# Patient Record
Sex: MALE | Race: BLACK OR AFRICAN AMERICAN
[De-identification: names, ages, dates, MRNs, and addresses within clinical notes are randomized per-mention and may not be internally consistent; named-entity substitution may affect disease eponyms.]

---

## 2017-06-12 ENCOUNTER — HOSPITAL ENCOUNTER (EMERGENCY)
Dept: HOSPITAL 47 - EC | Age: 52
Discharge: HOME | End: 2017-06-12
Payer: COMMERCIAL

## 2017-06-12 VITALS
SYSTOLIC BLOOD PRESSURE: 121 MMHG | DIASTOLIC BLOOD PRESSURE: 73 MMHG | HEART RATE: 53 BPM | RESPIRATION RATE: 18 BRPM | TEMPERATURE: 97.9 F

## 2017-06-12 DIAGNOSIS — K21.9: ICD-10-CM

## 2017-06-12 DIAGNOSIS — M54.9: Primary | ICD-10-CM

## 2017-06-12 DIAGNOSIS — R10.9: ICD-10-CM

## 2017-06-12 DIAGNOSIS — Z79.899: ICD-10-CM

## 2017-06-12 LAB
ALP SERPL-CCNC: 59 U/L (ref 38–126)
ALT SERPL-CCNC: 39 U/L (ref 21–72)
AMYLASE SERPL-CCNC: 101 U/L (ref 30–110)
ANION GAP SERPL CALC-SCNC: 11 MMOL/L
APTT BLD: 26.8 SEC (ref 22–30)
AST SERPL-CCNC: 28 U/L (ref 17–59)
BASOPHILS # BLD AUTO: 0 K/UL (ref 0–0.2)
BASOPHILS NFR BLD AUTO: 0 %
BUN SERPL-SCNC: 11 MG/DL (ref 9–20)
CALCIUM SPEC-MCNC: 9.6 MG/DL (ref 8.4–10.2)
CH: 29.1
CHCM: 32.6
CHLORIDE SERPL-SCNC: 109 MMOL/L (ref 98–107)
CO2 SERPL-SCNC: 23 MMOL/L (ref 22–30)
EOSINOPHIL # BLD AUTO: 0.3 K/UL (ref 0–0.7)
EOSINOPHIL NFR BLD AUTO: 4 %
ERYTHROCYTE [DISTWIDTH] IN BLOOD BY AUTOMATED COUNT: 5.06 M/UL (ref 4.3–5.9)
ERYTHROCYTE [DISTWIDTH] IN BLOOD: 14 % (ref 11.5–15.5)
GLUCOSE SERPL-MCNC: 104 MG/DL (ref 74–99)
HCT VFR BLD AUTO: 45.5 % (ref 39–53)
HDW: 2.39
HGB BLD-MCNC: 14.5 GM/DL (ref 13–17.5)
INR PPP: 1 (ref ?–1.1)
LUC NFR BLD AUTO: 2 %
LYMPHOCYTES # SPEC AUTO: 3.5 K/UL (ref 1–4.8)
LYMPHOCYTES NFR SPEC AUTO: 45 %
MCH RBC QN AUTO: 28.7 PG (ref 25–35)
MCHC RBC AUTO-ENTMCNC: 31.9 G/DL (ref 31–37)
MCV RBC AUTO: 89.9 FL (ref 80–100)
MONOCYTES # BLD AUTO: 0.3 K/UL (ref 0–1)
MONOCYTES NFR BLD AUTO: 5 %
NEUTROPHILS # BLD AUTO: 3.4 K/UL (ref 1.3–7.7)
NEUTROPHILS NFR BLD AUTO: 44 %
NON-AFRICAN AMERICAN GFR(MDRD): >60
PARTICLE COUNT: 1272
PH UR: 6 [PH] (ref 5–8)
POTASSIUM SERPL-SCNC: 4.3 MMOL/L (ref 3.5–5.1)
PROT SERPL-MCNC: 8 G/DL (ref 6.3–8.2)
PT BLD: 10.1 SEC (ref 9–12)
RBC UR QL: 1 /HPF (ref 0–5)
SODIUM SERPL-SCNC: 143 MMOL/L (ref 137–145)
SP GR UR: 1.01 (ref 1–1.03)
SQUAMOUS UR QL AUTO: <1 /HPF (ref 0–4)
UA BILLING (MACRO VS. MICRO): (no result)
UROBILINOGEN UR QL STRIP: <2 MG/DL (ref ?–2)
WBC # BLD AUTO: 0.14 10*3/UL
WBC # BLD AUTO: 7.6 K/UL (ref 3.8–10.6)
WBC #/AREA URNS HPF: 4 /HPF (ref 0–5)
WBC (PEROX): 6.96

## 2017-06-12 PROCEDURE — 96375 TX/PRO/DX INJ NEW DRUG ADDON: CPT

## 2017-06-12 PROCEDURE — 96374 THER/PROPH/DIAG INJ IV PUSH: CPT

## 2017-06-12 PROCEDURE — 85730 THROMBOPLASTIN TIME PARTIAL: CPT

## 2017-06-12 PROCEDURE — 83690 ASSAY OF LIPASE: CPT

## 2017-06-12 PROCEDURE — 81001 URINALYSIS AUTO W/SCOPE: CPT

## 2017-06-12 PROCEDURE — 80053 COMPREHEN METABOLIC PANEL: CPT

## 2017-06-12 PROCEDURE — 85025 COMPLETE CBC W/AUTO DIFF WBC: CPT

## 2017-06-12 PROCEDURE — 85610 PROTHROMBIN TIME: CPT

## 2017-06-12 PROCEDURE — 82150 ASSAY OF AMYLASE: CPT

## 2017-06-12 PROCEDURE — 74176 CT ABD & PELVIS W/O CONTRAST: CPT

## 2017-06-12 PROCEDURE — 99284 EMERGENCY DEPT VISIT MOD MDM: CPT

## 2017-06-12 PROCEDURE — 36415 COLL VENOUS BLD VENIPUNCTURE: CPT

## 2017-06-12 PROCEDURE — 96361 HYDRATE IV INFUSION ADD-ON: CPT

## 2017-06-12 NOTE — ED
General Adult HPI





- General


Chief complaint: Abdominal Pain


Stated complaint: Abd Pain


Time Seen by Provider: 06/12/17 07:32


Source: patient, RN notes reviewed


Mode of arrival: ambulatory


Limitations: no limitations





- History of Present Illness


Initial comments: 





Patient is a pleasant 51-year-old male presenting to the emergency department 

complaining of right flank pain.  Onset of symptoms was yesterday morning.  

Fairly sudden onset.  Discomfort is positional.  Discomfort is below the right 

CVA region.  No dysuria or hematuria.  No fever.  No nausea or vomiting.  No 

history of similar symptoms previously.





- Related Data


 Home Medications











 Medication  Instructions  Recorded  Confirmed


 


Acetaminophen Tab [Tylenol Tab] 1,000 mg PO Q6HR PRN 06/12/17 06/12/17


 


Multivitamins, Thera [Multivitamin 1 tab PO DAILY 06/12/17 06/12/17





(formulary)]   


 


Omeprazole Magnesium [Prilosec Otc] 20 mg PO DAILY 06/12/17 06/12/17








 Previous Rx's











 Medication  Instructions  Recorded


 


Hydrocodone/Acetaminophen [Norco 2 each PO Q6HR PRN #20 tab 06/12/17





5-325]  











 Allergies











Allergy/AdvReac Type Severity Reaction Status Date / Time


 


No Known Allergies Allergy   Verified 06/12/17 08:01














Review of Systems


ROS Statement: 


Those systems with pertinent positive or pertinent negative responses have been 

documented in the HPI.





ROS Other: All systems not noted in ROS Statement are negative.


Constitutional: Denies: fever


Eyes: Denies: eye pain


ENT: Denies: ear pain


Respiratory: Denies: dyspnea


Cardiovascular: Denies: chest pain


Endocrine: Denies: fatigue


Gastrointestinal: Denies: abdominal pain


Genitourinary: Denies: urgency, dysuria, frequency, hematuria, discharge


Musculoskeletal: Denies: arthralgia


Skin: Denies: rash


Neurological: Denies: headache





Past Medical History


Past Medical History: GERD/Reflux


History of Any Multi-Drug Resistant Organisms: None Reported


Past Surgical History: Hernia Repair, Orthopedic Surgery


Additional Past Surgical History / Comment(s): right knee


Past Anesthesia/Blood Transfusion Reactions: No Reported Reaction


Past Psychological History: No Psychological Hx Reported


Smoking Status: Never smoker


Past Alcohol Use History: None Reported


Past Drug Use History: None Reported





- Past Family History


  ** Mother


Family Medical History: Cancer





General Exam


Limitations: no limitations


General appearance: alert, in no apparent distress


Head exam: Present: atraumatic


Eye exam: Present: normal appearance


ENT exam: Present: normal oropharynx


Neck exam: Present: normal inspection


Respiratory exam: Present: normal lung sounds bilaterally


Cardiovascular Exam: Present: regular rate, normal rhythm


  ** Expanded


Peripheral pulses: 2+: Dorsalis Pedis (R), Dorsalis Pedis (L)


GI/Abdominal exam: Present: soft.  Absent: distended, tenderness, guarding, 

rebound, rigid


Extremities exam: Present: normal inspection.  Absent: pedal edema, calf 

tenderness


Back exam: Present: tenderness (Mild tenderness below the right CVA region)


Neurological exam: Present: alert


Psychiatric exam: Present: normal affect, normal mood


Skin exam: Present: normal color





Course


 Vital Signs











  06/12/17 06/12/17





  06:58 09:48


 


Temperature 98.2 F 97.9 F


 


Pulse Rate 79 53 L


 


Respiratory 16 18





Rate  


 


Blood Pressure 139/90 121/73


 


O2 Sat by Pulse 100 99





Oximetry  














Medical Decision Making





- Medical Decision Making





Patient reexamined and resting comfortably in bed.  Patient states some 

improvement with Toradol however is receptive to further medication.  Patient 

agrees not to drive.  Patient is updated on results and need for follow-up.





- Lab Data


Result diagrams: 


 06/12/17 08:12





 06/12/17 08:12


 Lab Results











  06/12/17 06/12/17 06/12/17 Range/Units





  08:12 08:12 08:12 


 


WBC   7.6   (3.8-10.6)  k/uL


 


RBC   5.06   (4.30-5.90)  m/uL


 


Hgb   14.5   (13.0-17.5)  gm/dL


 


Hct   45.5   (39.0-53.0)  %


 


MCV   89.9   (80.0-100.0)  fL


 


MCH   28.7   (25.0-35.0)  pg


 


MCHC   31.9   (31.0-37.0)  g/dL


 


RDW   14.0   (11.5-15.5)  %


 


Plt Count   196   (150-450)  k/uL


 


Neutrophils %   44   %


 


Lymphocytes %   45   %


 


Monocytes %   5   %


 


Eosinophils %   4   %


 


Basophils %   0   %


 


Neutrophils #   3.4   (1.3-7.7)  k/uL


 


Lymphocytes #   3.5   (1.0-4.8)  k/uL


 


Monocytes #   0.3   (0-1.0)  k/uL


 


Eosinophils #   0.3   (0-0.7)  k/uL


 


Basophils #   0.0   (0-0.2)  k/uL


 


PT    10.1  (9.0-12.0)  sec


 


INR    1.0  (<1.1)  


 


APTT    26.8  (22.0-30.0)  sec


 


Sodium  143    (137-145)  mmol/L


 


Potassium  4.3    (3.5-5.1)  mmol/L


 


Chloride  109 H    ()  mmol/L


 


Carbon Dioxide  23    (22-30)  mmol/L


 


Anion Gap  11    mmol/L


 


BUN  11    (9-20)  mg/dL


 


Creatinine  1.04    (0.66-1.25)  mg/dL


 


Est GFR (MDRD) Af Amer  >60    (>60 ml/min/1.73 sqM)  


 


Est GFR (MDRD) Non-Af  >60    (>60 ml/min/1.73 sqM)  


 


Glucose  104 H    (74-99)  mg/dL


 


Calcium  9.6    (8.4-10.2)  mg/dL


 


Total Bilirubin  0.6    (0.2-1.3)  mg/dL


 


AST  28    (17-59)  U/L


 


ALT  39    (21-72)  U/L


 


Alkaline Phosphatase  59    ()  U/L


 


Total Protein  8.0    (6.3-8.2)  g/dL


 


Albumin  4.7    (3.5-5.0)  g/dL


 


Amylase  101    ()  U/L


 


Lipase  144    ()  U/L


 


Urine Color     


 


Urine Appearance     (Clear)  


 


Urine pH     (5.0-8.0)  


 


Ur Specific Gravity     (1.001-1.035)  


 


Urine Protein     (Negative)  


 


Urine Glucose (UA)     (Negative)  


 


Urine Ketones     (Negative)  


 


Urine Blood     (Negative)  


 


Urine Nitrite     (Negative)  


 


Urine Bilirubin     (Negative)  


 


Urine Urobilinogen     (<2.0)  mg/dL


 


Ur Leukocyte Esterase     (Negative)  


 


Urine RBC     (0-5)  /hpf


 


Urine WBC     (0-5)  /hpf


 


Ur Squamous Epith Cells     (0-4)  /hpf


 


Urine Bacteria     (None)  /hpf


 


Urine Mucus     (None)  /hpf














  06/12/17 Range/Units





  09:04 


 


WBC   (3.8-10.6)  k/uL


 


RBC   (4.30-5.90)  m/uL


 


Hgb   (13.0-17.5)  gm/dL


 


Hct   (39.0-53.0)  %


 


MCV   (80.0-100.0)  fL


 


MCH   (25.0-35.0)  pg


 


MCHC   (31.0-37.0)  g/dL


 


RDW   (11.5-15.5)  %


 


Plt Count   (150-450)  k/uL


 


Neutrophils %   %


 


Lymphocytes %   %


 


Monocytes %   %


 


Eosinophils %   %


 


Basophils %   %


 


Neutrophils #   (1.3-7.7)  k/uL


 


Lymphocytes #   (1.0-4.8)  k/uL


 


Monocytes #   (0-1.0)  k/uL


 


Eosinophils #   (0-0.7)  k/uL


 


Basophils #   (0-0.2)  k/uL


 


PT   (9.0-12.0)  sec


 


INR   (<1.1)  


 


APTT   (22.0-30.0)  sec


 


Sodium   (137-145)  mmol/L


 


Potassium   (3.5-5.1)  mmol/L


 


Chloride   ()  mmol/L


 


Carbon Dioxide   (22-30)  mmol/L


 


Anion Gap   mmol/L


 


BUN   (9-20)  mg/dL


 


Creatinine   (0.66-1.25)  mg/dL


 


Est GFR (MDRD) Af Amer   (>60 ml/min/1.73 sqM)  


 


Est GFR (MDRD) Non-Af   (>60 ml/min/1.73 sqM)  


 


Glucose   (74-99)  mg/dL


 


Calcium   (8.4-10.2)  mg/dL


 


Total Bilirubin   (0.2-1.3)  mg/dL


 


AST   (17-59)  U/L


 


ALT   (21-72)  U/L


 


Alkaline Phosphatase   ()  U/L


 


Total Protein   (6.3-8.2)  g/dL


 


Albumin   (3.5-5.0)  g/dL


 


Amylase   ()  U/L


 


Lipase   ()  U/L


 


Urine Color  Yellow  


 


Urine Appearance  Clear  (Clear)  


 


Urine pH  6.0  (5.0-8.0)  


 


Ur Specific Gravity  1.012  (1.001-1.035)  


 


Urine Protein  Negative  (Negative)  


 


Urine Glucose (UA)  Negative  (Negative)  


 


Urine Ketones  Negative  (Negative)  


 


Urine Blood  Negative  (Negative)  


 


Urine Nitrite  Negative  (Negative)  


 


Urine Bilirubin  Negative  (Negative)  


 


Urine Urobilinogen  <2.0  (<2.0)  mg/dL


 


Ur Leukocyte Esterase  Trace H  (Negative)  


 


Urine RBC  1  (0-5)  /hpf


 


Urine WBC  4  (0-5)  /hpf


 


Ur Squamous Epith Cells  <1  (0-4)  /hpf


 


Urine Bacteria  Rare H  (None)  /hpf


 


Urine Mucus  Rare H  (None)  /hpf














- Radiology Data


Radiology results: image reviewed (Computed tomography scan of the abdomen and 

pelvis show simple left renal cyst.)





Disposition


Clinical Impression: 


 Back pain





Disposition: HOME SELF-CARE


Condition: Stable


Instructions:  Back Pain (ED)


Additional Instructions: 


Please follow-up with your primary care physician beginning of the week.  

Return for increased pain, fever, worsening symptoms or other concerns.


Prescriptions: 


Hydrocodone/Acetaminophen [Norco 5-325] 2 each PO Q6HR PRN #20 tab


 PRN Reason: Pain


Referrals: 


Moisés Rodriguez MD [Primary Care Provider] - 1-2 days


Time of Disposition: 10:02

## 2017-06-12 NOTE — CT
EXAMINATION TYPE: CT abdomen pelvis wo con

 

DATE OF EXAM: 6/12/2017

 

COMPARISON: 2/23/2010

 

INDICATION: Rt flank pain

 

DLP: 813 mGycm, Automated exposure control for dose reduction was used.

 

CONTRAST:  0 mL of Omnipaque 300. 

                        Study performed without Oral Contrast

 

TECHNIQUE: Axial images were obtained from above the diaphragm to the pubic rami in the axial plane a
t 5 mm thick sections.  Reconstructed images are reviewed on the computer in the coronal plane.  

 

FINDINGS:

 

Limited CT sections are obtained the lung bases.  The lung bases are clear.

 

CT ABDOMEN:

 

Liver: Normal

 

Spleen: Normal

 

Pancreas: Normal

 

Adrenal glands: The adrenal glands are normal.

 

Gallbladder: Normal  

 

Kidneys: No masses are evident. No hydronephrosis is present.   There is a 1.6 cm cyst on the posteri
or mid left kidney measuring 4 Hounsfield units. A very low dense area could represent a small lipoma
 or angiomyolipoma estimated 0.7 cm and measuring -72 Hounsfield units. Series 3 image 28.  Delayed i
mages were obtained through the kidneys. The suspected lipoma is not identified on the delayed images
. This cyst is better visualized. No hydronephrosis is evident.

 

Aorta: Vascular calcification is within the aorta. 

 

Inferior vena cava: Normal.

 

CT PELVIS: 

Loops of bowel within the abdomen and pelvis are normal.     There are loops of bowel which are incom
pletely distended or lack oral contrast limiting their evaluation.

 

Appendix: Normal as visualized.

 

Urinary bladder: Normal. 

 

Genitourinary structures: Prostate is prominent.

 

Osseous structures: No suspicious lytic or sclerotic lesions. Scoliosis within the lumbar spine. Some
 sacroiliac joint degenerative changes are noted. Some narrowing of the hip joint spaces is also note
d.

 

IMPRESSIONS:

1.  Simple left renal cyst.

## 2018-03-13 ENCOUNTER — HOSPITAL ENCOUNTER (OUTPATIENT)
Dept: HOSPITAL 47 - ORWHC2ENDO | Age: 53
Discharge: HOME | End: 2018-03-13
Attending: SURGERY
Payer: COMMERCIAL

## 2018-03-13 VITALS — RESPIRATION RATE: 18 BRPM

## 2018-03-13 VITALS — HEART RATE: 61 BPM | DIASTOLIC BLOOD PRESSURE: 88 MMHG | SYSTOLIC BLOOD PRESSURE: 125 MMHG

## 2018-03-13 VITALS — BODY MASS INDEX: 26.4 KG/M2

## 2018-03-13 VITALS — TEMPERATURE: 98.1 F

## 2018-03-13 DIAGNOSIS — K21.0: Primary | ICD-10-CM

## 2018-03-13 DIAGNOSIS — K44.9: ICD-10-CM

## 2018-03-13 DIAGNOSIS — K29.60: ICD-10-CM

## 2018-03-13 DIAGNOSIS — Z79.899: ICD-10-CM

## 2018-03-13 PROCEDURE — 43239 EGD BIOPSY SINGLE/MULTIPLE: CPT

## 2018-03-13 PROCEDURE — 45380 COLONOSCOPY AND BIOPSY: CPT

## 2018-03-13 PROCEDURE — 88305 TISSUE EXAM BY PATHOLOGIST: CPT

## 2018-03-13 NOTE — P.OP
Date of Procedure: 03/13/18


Preoperative Diagnosis: 


GERD


Postoperative Diagnosis: 


Antral gastritis





Moderate size sliding hiatal hernia





Esophagitis


Anesthesia: MAC


Surgeon: Sai Tejeda


Pathology: other (Antrum, esophagus)


Condition: stable


Disposition: PACU


Description of Procedure: 


The patient's placed on the endoscopy table in the lateral position.  He 

received IV sedation.  The gastroscope placed oropharynx and passed into the 

esophagus and into the stomach.  Scope was then placed through the pylorus.  

The first and second portion of the duodenum appeared normal.  Scope was then 

brought back the antrum this was mildly inflamed.  A biopsies performed.  Scope 

was then retroflexed.  The remainder stomach appeared normal.  There was a 

moderate size sliding hiatal hernia.  The GE junction was at 38 cm.  The distal 

esophagus appeared inflamed and a biopsies performed.  The proximal esophagus 

appeared normal.  Scope was withdrawn for patient.

## 2018-03-13 NOTE — P.GSHP
History of Present Illness


H&P Date: 03/13/18


Chief Complaint: GERD





This a 52-year-old male referred from Dr. Gonzalez.  Patient presents today for 

EGD.  He's had issues with GERD.





Past Medical History


Past Medical History: GERD/Reflux


History of Any Multi-Drug Resistant Organisms: None Reported


Past Surgical History: Hernia Repair, Orthopedic Surgery


Additional Past Surgical History / Comment(s): right knee sx


Past Anesthesia/Blood Transfusion Reactions: No Reported Reaction


Smoking Status: Never smoker





- Past Family History


  ** Mother


Family Medical History: Cancer





Medications and Allergies


 Home Medications











 Medication  Instructions  Recorded  Confirmed  Type


 


Omeprazole Magnesium [Prilosec Otc] 20 mg PO DAILY 06/12/17 03/13/18 History











 Allergies











Allergy/AdvReac Type Severity Reaction Status Date / Time


 


No Known Allergies Allergy   Verified 03/13/18 07:09














Surgical - Exam


 Vital Signs











Temp Pulse Resp BP Pulse Ox


 


 98.1 F   61   16   140/95   98 


 


 03/13/18 07:10  03/13/18 07:10  03/13/18 07:10  03/13/18 07:10  03/13/18 07:10














- General


well developed, no distress





- Eyes


PERRL





- ENT


normal pinna





- Neck


no masses





- Respiratory


normal expansion





- Cardiovascular


Rhythm: regular





- Abdomen


Abdomen: soft, non tender


Hernia: umbilical (Previous umbilical hernia with large firm mass questionable 

recurrent hernia)





Assessment and Plan


Assessment: 





GERD.  We'll perform EGD.

## 2018-03-22 ENCOUNTER — HOSPITAL ENCOUNTER (OUTPATIENT)
Dept: HOSPITAL 47 - LABWHC1 | Age: 53
Discharge: HOME | End: 2018-03-22
Attending: SURGERY
Payer: COMMERCIAL

## 2018-03-22 DIAGNOSIS — I51.7: ICD-10-CM

## 2018-03-22 DIAGNOSIS — F17.200: ICD-10-CM

## 2018-03-22 DIAGNOSIS — R94.31: ICD-10-CM

## 2018-03-22 DIAGNOSIS — Z01.818: Primary | ICD-10-CM

## 2018-03-22 DIAGNOSIS — K21.9: ICD-10-CM

## 2018-03-22 DIAGNOSIS — D64.9: ICD-10-CM

## 2018-03-22 DIAGNOSIS — Z01.812: ICD-10-CM

## 2018-03-22 LAB
BASOPHILS # BLD AUTO: 0 K/UL (ref 0–0.2)
BASOPHILS NFR BLD AUTO: 0 %
EOSINOPHIL # BLD AUTO: 0.2 K/UL (ref 0–0.7)
EOSINOPHIL NFR BLD AUTO: 4 %
ERYTHROCYTE [DISTWIDTH] IN BLOOD BY AUTOMATED COUNT: 4.92 M/UL (ref 4.3–5.9)
ERYTHROCYTE [DISTWIDTH] IN BLOOD: 13.6 % (ref 11.5–15.5)
HCT VFR BLD AUTO: 43.4 % (ref 39–53)
HGB BLD-MCNC: 13.6 GM/DL (ref 13–17.5)
LYMPHOCYTES # SPEC AUTO: 2.4 K/UL (ref 1–4.8)
LYMPHOCYTES NFR SPEC AUTO: 38 %
MCH RBC QN AUTO: 27.6 PG (ref 25–35)
MCHC RBC AUTO-ENTMCNC: 31.4 G/DL (ref 31–37)
MCV RBC AUTO: 88.1 FL (ref 80–100)
MONOCYTES # BLD AUTO: 0.4 K/UL (ref 0–1)
MONOCYTES NFR BLD AUTO: 7 %
NEUTROPHILS # BLD AUTO: 3.1 K/UL (ref 1.3–7.7)
NEUTROPHILS NFR BLD AUTO: 49 %
PLATELET # BLD AUTO: 201 K/UL (ref 150–450)
WBC # BLD AUTO: 6.3 K/UL (ref 3.8–10.6)

## 2018-03-22 PROCEDURE — 85025 COMPLETE CBC W/AUTO DIFF WBC: CPT

## 2018-03-22 PROCEDURE — 93005 ELECTROCARDIOGRAM TRACING: CPT

## 2018-03-22 PROCEDURE — 36415 COLL VENOUS BLD VENIPUNCTURE: CPT

## 2018-03-29 ENCOUNTER — HOSPITAL ENCOUNTER (OUTPATIENT)
Dept: HOSPITAL 47 - OR | Age: 53
Setting detail: OBSERVATION
LOS: 1 days | Discharge: HOME | End: 2018-03-30
Attending: SURGERY | Admitting: SURGERY
Payer: COMMERCIAL

## 2018-03-29 VITALS — BODY MASS INDEX: 26.4 KG/M2

## 2018-03-29 DIAGNOSIS — I10: ICD-10-CM

## 2018-03-29 DIAGNOSIS — K44.9: ICD-10-CM

## 2018-03-29 DIAGNOSIS — Z80.9: ICD-10-CM

## 2018-03-29 DIAGNOSIS — Z79.899: ICD-10-CM

## 2018-03-29 DIAGNOSIS — R06.83: ICD-10-CM

## 2018-03-29 DIAGNOSIS — K21.0: Primary | ICD-10-CM

## 2018-03-29 PROCEDURE — 86900 BLOOD TYPING SEROLOGIC ABO: CPT

## 2018-03-29 PROCEDURE — 74210 X-RAY XM PHRNX&/CRV ESOPH C+: CPT

## 2018-03-29 PROCEDURE — 86901 BLOOD TYPING SEROLOGIC RH(D): CPT

## 2018-03-29 PROCEDURE — 43281 LAP PARAESOPHAG HERN REPAIR: CPT

## 2018-03-29 PROCEDURE — 86850 RBC ANTIBODY SCREEN: CPT

## 2018-03-29 RX ADMIN — ONDANSETRON ONE MG: 2 INJECTION INTRAMUSCULAR; INTRAVENOUS at 07:16

## 2018-03-29 RX ADMIN — MORPHINE SULFATE PRN MG: 1 INJECTION, SOLUTION EPIDURAL; INTRATHECAL; INTRAVENOUS at 14:33

## 2018-03-29 RX ADMIN — DEXTROSE MONOHYDRATE, SODIUM CHLORIDE, AND POTASSIUM CHLORIDE SCH MLS/HR: 50; 4.5; 1.49 INJECTION, SOLUTION INTRAVENOUS at 13:06

## 2018-03-29 RX ADMIN — ONDANSETRON ONE MG: 2 INJECTION INTRAMUSCULAR; INTRAVENOUS at 09:37

## 2018-03-29 RX ADMIN — POTASSIUM CHLORIDE SCH MLS: 14.9 INJECTION, SOLUTION INTRAVENOUS at 07:11

## 2018-03-29 RX ADMIN — MORPHINE SULFATE PRN MG: 1 INJECTION, SOLUTION EPIDURAL; INTRATHECAL; INTRAVENOUS at 10:36

## 2018-03-29 RX ADMIN — LISINOPRIL AND HYDROCHLOROTHIAZIDE SCH EACH: 12.5; 1 TABLET ORAL at 13:07

## 2018-03-29 RX ADMIN — DEXTROSE MONOHYDRATE, SODIUM CHLORIDE, AND POTASSIUM CHLORIDE SCH: 50; 4.5; 1.49 INJECTION, SOLUTION INTRAVENOUS at 20:26

## 2018-03-29 RX ADMIN — FAMOTIDINE SCH MG: 10 INJECTION, SOLUTION INTRAVENOUS at 20:27

## 2018-03-29 RX ADMIN — MORPHINE SULFATE PRN MG: 1 INJECTION, SOLUTION EPIDURAL; INTRATHECAL; INTRAVENOUS at 18:46

## 2018-03-29 NOTE — CONS
CONSULTATION



DATE OF CONSULTATION:

03/29/2018



REASON FOR CONSULTATION:

Medical management requested by Dr. Tejeda.



CONSULTATION:

This is a pleasant 52-year-old patient of Dr. Tyson who has undergone Nissen

fundoplication surgery.  Patient for quite a while has been having reflux symptoms,

gets better with medication, gets worse if he stops taking it, does not interfere with

his work.  More so when he lays down, he stays. Finally decided to proceed with

surgery.  He was having some epigastric discomfort at times, too.  Something would make

it worse.  Post procedure, patient is having some pain at the operative site.  Tired.

Wife at the bedside.  Blood pressure is running a bit high, never had high blood

pressure before. Patient is also having a workup of obstructive sleep apnea, due to go

for study.  Patient snores a lot at night and wakes up suddenly, according to the wife.



REVIEW OF SYSTEMS:

CONSTITUTIONAL: Tired.

HEENT:  None.

RESPIRATORY:  None.

CARDIOVASCULAR:  None.

GASTROINTESTINAL:  As above.

GENITOURINARY:  None.

MUSCULOSKELETAL:  None.

DERMATOLOGICAL:  None.

HEMATOLOGIC:  None.

LYMPHATIC: None.

PSYCHIATRY:  None.

NEUROLOGICAL:  Snoring.



PAST MEDICAL HISTORY:

GERD, hiatal hernia, snoring workup in place with obstructive sleep apnea.



PAST SURGICAL HISTORY:

Right knee surgery, EGD, colonoscopy.



SOCIAL HISTORY:

No smoking.  Does marijuana twice a day.  Works as a  in a factory.  .



FAMILY HISTORY:

Cancer, type unknown.



HOME MEDICATIONS:

Prilosec 20 mg a day.



ALLERGIES:

None.



PHYSICAL EXAMINATION:

Temperature 98.2 pulse 65, respiratory rate is 16, blood pressure 152/87, pulse ox 94%

on room air.

GENERAL APPEARANCE:  Average build, lying in bed, tired appearing.

EYES:  Pupils equal, conjunctivae normal.

HEENT:  External appearance of nose and ears normal.

NECK:  JVD not raised.  Mass not palpable.  Respiratory effort normal.  Lungs are

clear.

CARDIOVASCULAR:  First and second sounds normal. No edema.

ABDOMEN:  Upper abdomen tenderness.  No guarding or rigidity.  Liver and spleen not

palpable.  Bowel sounds are present.

LYMPHATIC:  No lymph nodes palpable in neck or axillae.

PSYCHIATRY:  Alert and oriented x3.  Mood and affect normal.

NEUROLOGICAL:  Pupils equal. Cranial nerves grossly intact. Power and sensation grossly

intact.



INVESTIGATIONS:

Blood work from 3/22/2018 shows a white count of 6.3, hemoglobin 13.6.



ASSESSMENT:

1. Status post Nissen fundoplication for gastroesophageal reflux disease.

2. Chronic gastroesophageal reflux disease.

3. Chronic hiatal hernia.

4. Essential hypertension, new diagnosis.



PLAN:

Patient is to continue on PPIs.  We will start the patient on lisinopril and

hydrochlorothiazide.  Care was discussed with the patient and wife.  Patient is to

follow up with the doctor to keep his appointment for sleep apnea.  Pain management per

Dr. Tejeda.



Thank you Dr. Tejeda.





MMODL / KATHYN: [id number] / Job#: [id number]

## 2018-03-29 NOTE — FL
SINGLE CONTRAST esophagram:

 

HISTORY: 52-year-old male status post Nissen fundoplication, rule out leak/obstruction

 

TECHNIQUE:  Single contrast exam performed with 50 ml Omnipaque 350 contrast.

 

Total fluoroscopy time: 26 seconds.

Total images: 24.

 

 

FINDINGS:  

The patient swallowed oral contrast without difficulty or delay.  Esophageal peristalsis and motility
 are within normal limits. Postsurgical changes of Nissen fundoplication are demonstrated. There is g
ood flow of contrast along the course of the surgical site and accumulation in the stomach. There is 
no evidence of contrast extravasation to suggest leak. Trace post surgical free air seen below the ri
ght hemidiaphragm.

 

 

IMPRESSION: 

 

1. No evidence of leak or significant obstruction status post Nissen fundoplication. 

2. Trace postsurgical free air seen on the right.

## 2018-03-29 NOTE — P.GSHP
History of Present Illness


H&P Date: 03/29/18


Chief Complaint: GERD





This is a 52-year-old male referred from Dr. Pritchard.The patient has had long-

standing problems with reflux esophagitis.  The patient underwent recent EGD is 

found have evidence of esophagitis.  Patient has been well informed on the 

procedure of laparoscopic Nissen fundoplication.  The patient is aware the risk 

of the conversion to the open procedure, risk of injury to the stomach, liver 

and spleen.  The patient is also a risk of recurrent GERD and dysphagia 

symptoms.  The patient understands there is a postoperative diet of full 

liquids for 2 weeks after surgery.





Past Medical History


Past Medical History: GERD/Reflux


Additional Past Medical History / Comment(s): hiatal hernia,


History of Any Multi-Drug Resistant Organisms: None Reported


Past Surgical History: Hernia Repair, Orthopedic Surgery


Additional Past Surgical History / Comment(s): right knee surgery, EGD, 

colonoscopy


Past Anesthesia/Blood Transfusion Reactions: No Reported Reaction


Smoking Status: Never smoker





- Past Family History


  ** Mother


Family Medical History: Cancer





Medications and Allergies


 Home Medications











 Medication  Instructions  Recorded  Confirmed  Type


 


Omeprazole Magnesium [Prilosec Otc] 20 mg PO DAILY 06/12/17 03/29/18 History











 Allergies











Allergy/AdvReac Type Severity Reaction Status Date / Time


 


No Known Allergies Allergy   Verified 03/29/18 07:09














Surgical - Exam


 Vital Signs











Temp Pulse Resp BP Pulse Ox


 


 97.8 F   59 L  16   128/86   97 


 


 03/29/18 07:09  03/29/18 07:09  03/29/18 07:09  03/29/18 07:09  03/29/18 07:09














- General


well developed, no distress





- Eyes


PERRL





- ENT


normal pinna





- Neck


no masses





- Respiratory


normal expansion





- Cardiovascular


Rhythm: regular





- Abdomen


Abdomen: soft, non tender





Assessment and Plan


Assessment: 





GERD.  We'll perform laparoscopic Nissen fundoplication.

## 2018-03-29 NOTE — P.OP
Date of Procedure: 03/29/18


Preoperative Diagnosis: 


GERD


Postoperative Diagnosis: 


GERD


Procedure(s) Performed: 


Laparoscopic Nissen fundoplication


Anesthesia: BASHIR


Surgeon: Sai Tejeda


Pathology: none sent


Condition: stable


Disposition: PACU


Description of Procedure: 


The patient was placed on the operating table in the supine position.  The 

patient received general anesthesia.  And was placed in dorsal lithotomy 

position.  The patient was prepped and draped in the usual sterile fashion.  

The skin incision sites were anesthetized with 1% local Xylocaine.  The skin 

was incised in the left periumbilical area and then using a blade less 5 mm 

trocar under direct visualization panel cavity was entered.  After adequate 

insufflation the laparoscope was then placed into the peritoneal cavity.  Next 

a 5 mm trochars placed in the right epigastric position.  Another 5 millimeter 

trocar the right lateral position.  Another 5 millimeter trocar in the left 

lateral position a 5 mm trocar is placed in the left epigastric position.  And 

then the initial 5 mm trocar was exchanged for a 10 mm trocar.  The left 

lateral lobe liver was retracted.  The hernia was seen.  The crural defect was 

then dissected using the Harmonic scissors device.  A 360 crural dissection 

was performed the esophagus stomach was reduced back into the peritoneal 

Cavity.  The crural defect was then closed using 2-0 Ethibond suture.  Next the 

fundus of the stomach was mobilized using the [place] scissors device. and then 

a 58-Amharic bougie dilator was placed oropharynx passed into the esophagus and 

stomach the fundal plication wrap was then performed by grasping the fundus 

posteriorly and bringing it around the esophagus and stomach fundoplication was 

then performed using 2-0 Ethibond suture.  Care was taken that the fundal 

location rested over top of the intra-abdominal esophagus.  There was no injury 

seen to the stomach or esophagus.  The dilator was then withdrawn.  The abdomen 

was irrigated there is no bleeding seen.  The trochars were then withdrawn and 

then skin incision sites were closed using 3-0 Monocryl suture Steri-Strips are 

applied.  Patient thought procedure well and sent to recovery room in stable 

condition.

## 2018-03-30 VITALS
DIASTOLIC BLOOD PRESSURE: 99 MMHG | RESPIRATION RATE: 17 BRPM | TEMPERATURE: 98.8 F | HEART RATE: 70 BPM | SYSTOLIC BLOOD PRESSURE: 154 MMHG

## 2018-03-30 RX ADMIN — LISINOPRIL AND HYDROCHLOROTHIAZIDE SCH EACH: 12.5; 1 TABLET ORAL at 08:51

## 2018-03-30 RX ADMIN — DEXTROSE MONOHYDRATE, SODIUM CHLORIDE, AND POTASSIUM CHLORIDE SCH: 50; 4.5; 1.49 INJECTION, SOLUTION INTRAVENOUS at 04:31

## 2018-03-30 RX ADMIN — POTASSIUM CHLORIDE SCH: 14.9 INJECTION, SOLUTION INTRAVENOUS at 06:33

## 2018-03-30 RX ADMIN — FAMOTIDINE SCH MG: 10 INJECTION, SOLUTION INTRAVENOUS at 08:51

## 2018-03-30 RX ADMIN — DEXTROSE MONOHYDRATE, SODIUM CHLORIDE, AND POTASSIUM CHLORIDE SCH: 50; 4.5; 1.49 INJECTION, SOLUTION INTRAVENOUS at 08:50

## 2018-03-30 RX ADMIN — MORPHINE SULFATE PRN MG: 1 INJECTION, SOLUTION EPIDURAL; INTRATHECAL; INTRAVENOUS at 01:33

## 2018-03-30 NOTE — PN
PROGRESS NOTE



DATE OF SERVICE:

03/30/2018



PRESENTING COMPLAINT:

Abdominal surgery.



INTERVAL HISTORY:

Patient is status post Nissen fundoplication, is doing much better today, up and about

in the hallway.  No nausea, vomiting, did tolerate a clear liquid diet.  Slight pain is

present.



REVIEW OF SYSTEMS:

Done for constitutional, cardiovascular, GI, pulmonary; relevant findings as above.



CURRENT MEDICATIONS:

Reviewed that include Zestoretic.



EXAMINATION:

Temperature 98.8, pulse 70, respirations 17, blood pressure 124/99, pulse ox 96% on

room air.

GENERAL APPEARANCE:  Sitting up, comfortable.

EYES:  Pupils normal.  Conjunctivae normal.

HEENT:  External nose and ears normal.  Oral cavity normal.

NECK:  JVD not raised.  Mass not palpable.

RESPIRATORY:  Effort normal.  Lungs are clear.

CARDIOVASCULAR:  First and second sounds normal.  No edema.

ABDOMEN:  Minimal tenderness, soft.  Liver, spleen not palpable.

PSYCHIATRY:  Alert and oriented x3.  Mood and affect were normal.



INVESTIGATIONS:

No blood work.



ASSESSMENT:

1. Status post Nissen fundoplication.

2. Chronic gastroesophageal reflux disease.

3. Chronic hiatal hernia, repair as above.

4. Essential hypertension.



PLAN:

Patient is told to take his medications and continue his PPI.  Should follow up with

his family doctor upon discharge.



Thank you, Dr. Tejeda.





MELECIOL / KATHYN: [id number] / Job#: [id number]

## 2018-03-30 NOTE — P.DS
Providers


Date of admission: 


03/29/18 18:59





Expected date of discharge: 03/30/18


Attending physician: 


Sai Tejeda





Consults: 





 





03/29/18 09:11


Consult Physician Routine 


   Consulting Provider: Reddy Zhao


   Consult Reason/Comments: Medical management


   Do you want consulting provider notified?: Yes











Primary care physician: 


Moisés Rodriguez





[facility] Course: 





52-year-old male presented on an elective admission to undergo a procedure 

laparoscopic Nissen fundoplication for symptomatic esophageal reflux.  Done on 

March 29.  Patient was tolerating a full liquid diet.  Surgical dressing sites 

dry.  Esophagram with no evidence of a leak or significant obstruction  Patient'

s blood pressure was noted to be slightly elevated  this can be worked up in 

the outpatient setting by his family doctor  this was discussed with the 

patient.  Additionally patient reportedly is being worked up for obstructive 

sleep apnea sleep study can be arranged with his primary doctor as well.  

Patient was felt to be stable and appropriate proceed with a discharge to home





Impression discharge diagnosis


Essential hypertension new diagnosis


March 29 laparoscopic Nissen fundoplication for symptomatic esophageal reflux 

symptoms


Chronic hiatal hernia


Chronic gastroesophageal reflux


Recent EGD showing evidence of esophagitis











The above impression and plan of care have been discussed and directed by 

signing physician. Lary Mota nurse practitioner acting as scribe for signing 

physician.





Plan - Discharge Summary


Discharge Rx Participant: Yes


New Discharge Prescriptions: 


New


   Lisinopril-Hctz 10-12.5 mg [Zestoretic 10-12.5] 1 each PO DAILY #30 tab


   HYDROcodone/APAP 5-325MG [Norco 5-325] 1 tab PO Q4HR PRN #15 tab


     PRN Reason: Mild To Moderate Pain





No Action


   Omeprazole Magnesium [Prilosec Otc] 20 mg PO DAILY


Discharge Medication List





Omeprazole Magnesium [Prilosec Otc] 20 mg PO DAILY 06/12/17 [History]


HYDROcodone/APAP 5-325MG [Norco 5-325] 1 tab PO Q4HR PRN #15 tab 03/30/18 [Rx]


Lisinopril-Hctz 10-12.5 mg [Zestoretic 10-12.5] 1 each PO DAILY #30 tab 03/30/ 18 [Rx]








Follow up Appointment(s)/Referral(s): 


Sai Tejeda MD [STAFF PHYSICIAN] - 1 Week


Moisés Rodriguez MD [Primary Care Provider] - 04/02/18


Activity/Diet/Wound Care/Special Instructions: 


No tub bath for six weeks.


Shower daily. 


No lifting over 10 pounds for the next 6 weeks.


Postsurgical procedure liquid diet for 2 weeks


Follow-up with Dr rodriguez.  Within the week for further recommendations for 

hypertension


May use ice packs to surgical site.


No driving while taking narcotic for pain.





Discharge Disposition: HOME SELF-CARE

## 2018-07-18 ENCOUNTER — HOSPITAL ENCOUNTER (EMERGENCY)
Dept: HOSPITAL 47 - EC | Age: 53
Discharge: HOME | End: 2018-07-18
Payer: COMMERCIAL

## 2018-07-18 VITALS
DIASTOLIC BLOOD PRESSURE: 85 MMHG | SYSTOLIC BLOOD PRESSURE: 134 MMHG | HEART RATE: 54 BPM | RESPIRATION RATE: 18 BRPM | TEMPERATURE: 98 F

## 2018-07-18 DIAGNOSIS — S93.401A: Primary | ICD-10-CM

## 2018-07-18 DIAGNOSIS — Z98.890: ICD-10-CM

## 2018-07-18 PROCEDURE — 99284 EMERGENCY DEPT VISIT MOD MDM: CPT

## 2018-07-18 NOTE — ED
Lower Extremity Injury HPI





- General


Chief Complaint: Extremity Injury, Lower


Stated Complaint: Poss DVT sent by ME


Time Seen by Provider: 07/18/18 10:43


Source: patient, RN notes reviewed, old records reviewed


Mode of arrival: wheelchair


Limitations: physical limitation





- History of Present Illness


Initial Comments: 





52-year-old male persist presentation with right leg swelling.  He reports that 

on July 4 he sprained his ankle.  He states since that time he's been working 

and standing on it.  He states that he has been having pain and worsening 

swelling.  He denies any peripheral paresthesias.  Pain is mainly on ankle and 

he reports severe swelling of the foot.  No history of blood clots.  Was sent 

in by [employer] wrist or ultrasound for concern for DVT.





- Related Data


 Previous Rx's











 Medication  Instructions  Recorded


 


Ibuprofen [Motrin] 600 mg PO Q6HR PRN #20 tab 07/18/18











 Allergies











Allergy/AdvReac Type Severity Reaction Status Date / Time


 


No Known Allergies Allergy   Verified 07/18/18 11:06














Review of Systems


ROS Statement: 


Those systems with pertinent positive or pertinent negative responses have been 

documented in the HPI.





ROS Other: All systems not noted in ROS Statement are negative.





Past Medical History


Past Medical History: GERD/Reflux


Additional Past Medical History / Comment(s): hiatal hernia,


History of Any Multi-Drug Resistant Organisms: None Reported


Past Surgical History: Hernia Repair, Orthopedic Surgery


Additional Past Surgical History / Comment(s): right knee surgery, EGD, 

colonoscopy


Past Anesthesia/Blood Transfusion Reactions: No Reported Reaction


Past Psychological History: No Psychological Hx Reported


Smoking Status: Never smoker


Past Alcohol Use History: None Reported


Past Drug Use History: Marijuana





- Past Family History


  ** Mother


Family Medical History: Cancer





General Exam





- General Exam Comments


Initial Comments: 





52-year-old male.  Alert and oriented.  No significant distress.





Limitations: physical limitation


General appearance: alert, in no apparent distress


Head exam: Present: atraumatic, normocephalic, normal inspection


Eye exam: Present: normal appearance, PERRL, EOMI.  Absent: scleral icterus, 

conjunctival injection, periorbital swelling


ENT exam: Present: normal exam, mucous membranes moist


Neck exam: Present: normal inspection.  Absent: tenderness, meningismus, 

lymphadenopathy


Respiratory exam: Present: normal lung sounds bilaterally.  Absent: respiratory 

distress, wheezes, rales, rhonchi, stridor


Cardiovascular Exam: Present: regular rate, normal rhythm, normal heart sounds.

  Absent: systolic murmur, diastolic murmur, rubs, gallop, clicks


GI/Abdominal exam: Present: soft, normal bowel sounds.  Absent: distended, 

tenderness, guarding, rebound, rigid


Extremities exam: Present: full ROM, normal capillary refill.  Absent: normal 

inspection, tenderness, pedal edema, joint swelling, calf tenderness


  ** Right


Lower Leg exam: Present: normal inspection, full ROM


Ankle exam: Present: tenderness (Patient has significant swelling and 

tenderness over the lateral and medial malleolus.  Swelling extends to the 

dorsum of the foot.), swelling.  Absent: normal inspection


Foot/Toe exam: Present: tenderness, swelling.  Absent: normal inspection


Neurovascular tendon exam: Present: no vascular compromise


Gait: observed and limited by pain





Course


 Vital Signs











  07/18/18





  10:41


 


Temperature 98.7 F


 


Pulse Rate 58 L


 


Respiratory 16





Rate 


 


Blood Pressure 145/97


 


O2 Sat by Pulse 99





Oximetry 














Medical Decision Making





- Medical Decision Making





This patient's 52-year-old male with 2 weeks of increased pain and swelling 

over the left foot and ankle.  He was sent him express for an ultrasound.  

Today he has normal pulses and sensation in the foot.  He does have significant 

swelling in the foot and lateral and medial malleolus.  X-rays were obtained 

and showed no evidence of acute fractures in the foot or ankle.  Ultrasound was 

completed and is negative for DVT.  However he does have quite extensive 

swelling.  Concern for a very severe sprain.  I will write the Patient for 

repeat ultrasound in 2 days.  The Patient denies amphetamines or medicine given 

a walking boot and crutches.  Discussed discussed following up with orthopedic 

and primary care physician.





- Radiology Data


Radiology results: report reviewed


Leg is negative for DVT.  No fracture dislocation evident.  No acute fracture 

dislocation seen in the right ankle.  Soft tissue swelling of the medial 

lateral malleolus noted.





Disposition


Clinical Impression: 


 Right ankle swelling, Ankle sprain





Disposition: HOME SELF-CARE


Condition: Good


Instructions:  Ankle Sprain (ED)


Additional Instructions: 


Rest, ice, and elevate extremity.  Repeat ultrasound in 2 days and follow-up 

with primary care physician.  Also follow-up with orthopedic.  Ambulate with 

crutches and use the walking boot.


Prescriptions: 


Ibuprofen [Motrin] 600 mg PO Q6HR PRN #20 tab


 PRN Reason: Pain


Is patient prescribed a controlled substance at d/c from ED?: No


When asked, does pt state using other controlled substances?: No


If prescribed controlled substance>3 days was MAPS reviewed?: No


If opioid is for acute pain is fill amount 7 days or less?: No


If Rx opioid, was Start Talking consent form obtained?: No


Referrals: 


Moisés Rodriguez MD [Primary Care Provider] - 1-2 days


Ronald Terrell MD [STAFF PHYSICIAN] - 1-2 days


Time of Disposition: 13:06

## 2018-07-18 NOTE — XR
EXAMINATION TYPE: XR ankle limited RT

 

DATE OF EXAM: 7/18/2018

 

COMPARISON: NONE

 

HISTORY: Pain

 

TECHNIQUE:  Frontal, lateral and oblique images of the right ankle are obtained.

 

COMPARISON: None.

 

FINDINGS:  There is no acute fracture/dislocation evident. The joint spaces  appear within normal michelle
its. Soft tissue swelling noted about the medial and lateral malleolar regions. 

IMPRESSION:  There is no acute fracture or dislocation seen.

## 2018-07-18 NOTE — US
EXAMINATION TYPE: US venous doppler duplex LE RT

 

DATE OF EXAM: 7/18/2018 12:09 PM

 

COMPARISON: NONE

 

CLINICAL HISTORY: Pain. Right ankle and foot swelling x 2 weeks following ankle sprain.

 

SIDE PERFORMED: Right  

 

TECHNIQUE:  The lower extremity deep venous system is examined utilizing real time linear array sonog
jozef with graded compression, doppler sonography and color-flow sonography.

 

VESSELS IMAGED:

External Iliac Vein (EIV)

Common Femoral Vein

Deep Femoral Vein

Greater Saphenous Vein *

Femoral Vein

Popliteal Vein

Small Saphenous Vein *

Proximal Calf Veins

(* superficial vessels)

 

 

 

Right Leg:  Appears negative for DVT

 

 

 

IMPRESSION: No evidence for DVT.

## 2018-07-18 NOTE — XR
EXAMINATION TYPE: XR foot complete RT

 

DATE OF EXAM: 7/18/2018

 

CLINICAL HISTORY: pain

 

TECHNIQUE: Frontal, lateral and oblique images of the right foot are obtained.

 

COMPARISON: None.

 

FINDINGS:  There is no acute fracture/dislocation evident. The joint spaces  appear within normal michelle
its. Hallux valgus deformity great toe. The overlying soft tissue appears unremarkable.

 

IMPRESSION:  

There is no acute fracture or dislocation.

 

ICD 10 NO FRACTURE, INITIAL EVALUATION

## 2018-08-10 ENCOUNTER — HOSPITAL ENCOUNTER (OUTPATIENT)
Dept: HOSPITAL 47 - RADMRIMAIN | Age: 53
End: 2018-08-10
Attending: ORTHOPAEDIC SURGERY
Payer: COMMERCIAL

## 2018-08-10 DIAGNOSIS — S90.01XA: ICD-10-CM

## 2018-08-10 DIAGNOSIS — M25.471: ICD-10-CM

## 2018-08-10 DIAGNOSIS — S92.214A: Primary | ICD-10-CM

## 2018-08-10 DIAGNOSIS — M65.871: ICD-10-CM

## 2018-08-10 DIAGNOSIS — M60.9: ICD-10-CM

## 2018-08-13 NOTE — MR
EXAMINATION TYPE: MR ankle RT wo con

 

DATE OF EXAM: 8/10/2018

 

COMPARISON: Right ankle radiograph dated 7/18/2018

 

HISTORY: Right ankle pain for after twisting injury

 

TECHNIQUE: Multiplanar, multisequence images of the right ankle were acquired without intravenous con
trast.

 

FINDINGS: 

There is abnormal bone marrow within the anterior facet of the calcaneus, cuboid bone, middle facet o
f the talus at its lateral margin and to a lesser degree within the inferior aspect of the lateral cu
neiform. There is a nondisplaced fracture occult on radiographs of the inferior posterior cuboid at t
he articulation with the calcaneus. Slight bone marrow edema is also seen within the distal fibula wi
thout linear fracture line. Remaining bone marrow signal is within normal limits.

 

The anterior compartment is unremarkable. The posterior tibialis and flexor digitorum longus are unre
markable in signal and morphology. There is thickening and abnormal signal of the peroneus brevis and
 longus as they course lateral to the fracture site with surrounding soft tissue swelling.

 

There is a small tibiotalar joint effusion. Achilles tendon appears intact, however there is fat stra
nding in the pretracheal is fat pad and increased signal of the soleus distal muscular body likely re
lated to low-grade muscular strain. A small amount of fluid is present surrounding the flexor digitor
um longus related to tenosynovitis. No discontinuity is seen. The Lisfranc ligament is intact. There 
is high signal intensity in the region of the spring ligament suggestive of low-grade injury without 
tear.

 

The anterior talofibular ligament and posterior talofibular ligament are intact although there is inc
reased signal in the anterior talofibular ligament compatible with low-grade sprain. The deltoid liga
ment is intact and unremarkable. Very mild osseous irregularity at the talonavicular joint is indicat
angelica of mild arthropathy.

 

IMPRESSION: 

 

1. Radiographically occult nondisplaced and noncomminuted inferior posterior cuboid fracture with bon
e marrow edema relating to osseous contusions of the cuboid, anterior facet of the calcaneus, middle 
facet of the talus, and lateral margin of the inferior lateral cuneiform.

2. Mild osseous contusion of the distal aspect of the fibula without fracture.

3. Moderate grade posttraumatic tendinopathy of the peroneus brevis and longus as it courses lateral 
to the fracture site. Overlying subcutaneous soft tissue swelling is also seen. 

4. Small tibiotalar joint effusion. Pre-Achilles fat pad edema and distal soleus myositis.

5. Abnormal signal without tear of the anterior talofibular ligament and spring ligament compatible w
ith low-grade sprain.

6. Mild flexor digitorum longus tenosynovitis.

## 2020-07-09 ENCOUNTER — HOSPITAL ENCOUNTER (EMERGENCY)
Dept: HOSPITAL 47 - EC | Age: 55
Discharge: HOME | End: 2020-07-09
Payer: COMMERCIAL

## 2020-07-09 DIAGNOSIS — S01.01XA: Primary | ICD-10-CM
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
## 2020-07-09 PROCEDURE — 72125 CT NECK SPINE W/O DYE: CPT

## 2020-07-09 PROCEDURE — 99283 EMERGENCY DEPT VISIT LOW MDM: CPT

## 2020-07-09 PROCEDURE — 70450 CT HEAD/BRAIN W/O DYE: CPT

## 2020-07-09 PROCEDURE — 12001 RPR S/N/AX/GEN/TRNK 2.5CM/<: CPT

## 2020-07-09 NOTE — ED
Head Injury HPI





- General


Chief complaint: Head Injury


Stated complaint: Head laceration


Time Seen by Provider: 07/09/20 18:55


Source: patient


Mode of arrival: ambulatory


Limitations: no limitations





- History of Present Illness


Initial comments: 





Patient is a 54-year-old male was playing in the garage at home with his 

grandson when he stated that a hammer hit him on the top of his head.  He 

sustained a laceration which was bleeding but bleeding is controlled.  The 

patient does not take any blood thinners.  He denies loss of consciousness.  

Denies having a headache or visual changes.  No nausea or vomiting.  Denies any 

neck pain.  Patient denies any additional injuries.  No other alleviating, 

precipitating or modifying factors





- Related Data


                                  Previous Rx's











 Medication  Instructions  Recorded


 


Ibuprofen [Motrin] 600 mg PO Q6HR PRN #20 tab 07/18/18











Allergies/Adverse reactions: 


                                    Allergies











Allergy/AdvReac Type Severity Reaction Status Date / Time


 


No Known Allergies Allergy   Verified 07/09/20 18:58














Review of Systems


ROS Statement: 


Those systems with pertinent positive or pertinent negative responses have been 

documented in the HPI.





ROS Other: All systems not noted in ROS Statement are negative.





Past Medical History


Past Medical History: GERD/Reflux


Additional Past Medical History / Comment(s): hiatal hernia,


History of Any Multi-Drug Resistant Organisms: None Reported


Past Surgical History: Hernia Repair, Orthopedic Surgery


Additional Past Surgical History / Comment(s): right knee surgery, EGD, 

colonoscopy


Past Anesthesia/Blood Transfusion Reactions: No Reported Reaction


Past Psychological History: No Psychological Hx Reported


Smoking Status: Never smoker


Past Alcohol Use History: None Reported


Past Drug Use History: Marijuana





- Past Family History


  ** Mother


Family Medical History: Cancer





General Exam


Limitations: no limitations


General appearance: alert, in no apparent distress


Head exam: Present: normocephalic, other (laceration 2.5 cm occiput. Bleeding 

controlled. No deep structure involvement.)


Eye exam: Present: normal appearance, PERRL, EOMI.  Absent: scleral icterus, 

conjunctival injection, periorbital swelling


ENT exam: Present: normal exam, mucous membranes moist


Neck exam: Present: normal inspection.  Absent: tenderness, meningismus, 

lymphadenopathy


Respiratory exam: Present: normal lung sounds bilaterally.  Absent: respiratory 

distress, wheezes, rales, rhonchi, stridor


Cardiovascular Exam: Present: regular rate, normal rhythm, normal heart sounds. 

 Absent: systolic murmur, diastolic murmur, rubs, gallop, clicks


GI/Abdominal exam: Present: soft, normal bowel sounds.  Absent: distended, 

tenderness, guarding, rebound, rigid


Extremities exam: Present: normal inspection, full ROM, normal capillary refill.

  Absent: tenderness, pedal edema, joint swelling, calf tenderness


Back exam: Present: normal inspection


Neurological exam: Present: alert, oriented X3, CN II-XII intact


Psychiatric exam: Present: normal affect, normal mood


Skin exam: Present: warm, dry, intact, normal color.  Absent: rash





Course


                                   Vital Signs











  07/09/20 07/09/20





  18:55 19:58


 


Temperature 98.3 F 


 


Pulse Rate 77 20 L


 


Respiratory 18 17





Rate  


 


Blood Pressure 125/84 139/96


 


O2 Sat by Pulse 98 





Oximetry  














Procedures





- Laceration


  ** Laceration #1


Consent Obtained: verbal consent


Indication: laceration


Site: scalp


Size (cm): 25 (mm)


Description: linear


Depth: simple, single layer


Number of Sutures: 2 (staples)


Patient Tolerated Procedure: well, no complications





Medical Decision Making





- Medical Decision Making





Upon arrival the patient was placed in room 16.  There are history of physical 

exam is performed.  Patient's laceration was repaired using 2 staples after was 

thoroughly cleansed with 500 mL of normal saline.  Patient's tetanus is 

up-to-date.  He is sent over for CT of his head and cervical spine demonstrates 

no acute fractures or intracranial bleeding.  He was given Tylenol.  Patient be 

discharged home at this time is follow up with his primary care physician.  

Instructed to return in 5 days to the staples removed.  Return to the emergency 

room for any new or worsening symptoms.  Patient was discharged home in stable 

condition





Disposition


Clinical Impression: 


 Head injury, Scalp laceration





Disposition: HOME SELF-CARE


Condition: Stable


Instructions (If sedation given, give patient instructions):  Concussion (ED), 

Staple Care (ED)


Additional Instructions: 


Please have your staples removed in 5 days.  You may either go to your primary 

care office or return here.  Return to the emergency room for any new or 

worsening symptoms


Is patient prescribed a controlled substance at d/c from ED?: No


Referrals: 


Moisés Rodriguez MD [Primary Care Provider] - 1-2 days


Time of Disposition: 20:49

## 2020-07-09 NOTE — CT
EXAMINATION TYPE: CT brain cspine wo con

 

DATE OF EXAM: 7/9/2020

 

COMPARISON: NONE

 

HISTORY: Head laceration and headache with neck pain from injury.

 

CT DLP: 1416.5 mGycm. Automated Exposure Control for Dose Reduction was Utilized.

 

 

TECHNIQUE: CT scan of the head and cervical spine are performed without contrast.

 

FINDINGS:   There is no acute intracranial hemorrhage or midline shift identified.  The ventricles an
d sulci are within normal limits in size for patient's age. The calvarium is intact. The globes are i
ntact and the visualized sinuses are clear.

 

Cervical spine is visualized in its entirety from C1 through upper thoracic levels and demonstrates l
evoconvex scoliosis centered upper to mid thoracic spine without evidence of acute fracture or disloc
ation.  Prevertebral soft tissue appears within normal limits.  The C1-C2 articulation is satisfactor
y on the coronal images.  Vertebral body heights are maintained. Mild to moderate disc space narrowin
g C5-C6 and C6-C7 levels with moderate anterior spurring. Mild-to-moderate disc space narrowing and s
purring C7-T1 level. Posterior spurring effaces the anterior thecal sac C5-C6 level. Axial images evelyn
w multilevel uncovertebral facet degenerative changes contributing to multilevel neural foraminal tara
rowing. Lung apices show no pneumothorax.

 

IMPRESSION:

1. There is no acute fracture or dislocation evident in the cervical spine.

2. No acute intracranial hemorrhage or midline shift is seen.

## 2020-07-10 VITALS
TEMPERATURE: 98.3 F | HEART RATE: 20 BPM | SYSTOLIC BLOOD PRESSURE: 139 MMHG | RESPIRATION RATE: 17 BRPM | DIASTOLIC BLOOD PRESSURE: 96 MMHG